# Patient Record
Sex: MALE | Race: BLACK OR AFRICAN AMERICAN
[De-identification: names, ages, dates, MRNs, and addresses within clinical notes are randomized per-mention and may not be internally consistent; named-entity substitution may affect disease eponyms.]

---

## 2019-02-16 ENCOUNTER — HOSPITAL ENCOUNTER (EMERGENCY)
Dept: HOSPITAL 97 - ER | Age: 29
Discharge: HOME | End: 2019-02-16
Payer: COMMERCIAL
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
## 2019-02-16 DIAGNOSIS — Z88.0: ICD-10-CM

## 2019-02-16 DIAGNOSIS — Z72.0: ICD-10-CM

## 2019-02-16 DIAGNOSIS — S62.360A: Primary | ICD-10-CM

## 2019-02-16 PROCEDURE — 99284 EMERGENCY DEPT VISIT MOD MDM: CPT

## 2019-02-16 PROCEDURE — 2W3CX1Z IMMOBILIZATION OF RIGHT LOWER ARM USING SPLINT: ICD-10-PCS

## 2019-02-16 NOTE — ER
Nurse's Notes                                                                                     

 [facility]                                                                

Name: Jed Del Valle Jr                                                                            

Age: 29 yrs                                                                                       

Sex: Male                                                                                         

: 1990                                                                                   

MRN: [id number]                                                                                   

Arrival Date: 2019                                                                          

Time: 18:01                                                                                       

Account#: [id number]                                                                            

Bed 28                                                                                            

Private MD:                                                                                       

Diagnosis: Nondisplaced fracture of neck of second metacarpal bone, right hand                    

                                                                                                  

Presentation:                                                                                     

                                                                                             

18:03 Presenting complaint: Patient states: i dropped a speaker on my R hand, happened early  hj  

      today; reports numbness, tingling and pain; pain is 10/10;. Transition of care: patient     

      was not received from another setting of care. Onset of symptoms was 2019.     

      Risk Assessment: Do you want to hurt yourself or someone else? Patient reports no           

      desire to harm self or others. Initial Sepsis Screen: Does the patient meet any 2           

      criteria? No. Patient's initial sepsis screen is negative. Does the patient have a          

      suspected source of infection? No. Patient's initial sepsis screen is negative. Care        

      prior to arrival: None.                                                                     

18:03 Method Of Arrival: Ambulatory                                                             

18:03 Acuity: TIAGO 4                                                                           hj  

                                                                                                  

Triage Assessment:                                                                                

18:06 General: Appears in no apparent distress. uncomfortable, Behavior is calm, cooperative, hj  

      appropriate for age. Pain: Complains of pain in right hand.                                 

                                                                                                  

Historical:                                                                                       

- Allergies:                                                                                      

18:13 PENICILLINS;                                                                            mg2 

- Home Meds:                                                                                      

18:06 None [Active];                                                                          hj  

- PMHx:                                                                                           

18:06 None;                                                                                   hj  

- PSHx:                                                                                           

18:06 hand surgery;                                                                           hj  

                                                                                                  

- Immunization history:: Adult Immunizations up to date.                                          

- Social history:: Smoking status: Patient uses tobacco products, Patient uses alcohol.           

- Ebola Screening: : Patient negative for fever greater than or equal to 101.5 degrees            

  Fahrenheit, and additional compatible Ebola Virus Disease symptoms Patient denies               

  exposure to infectious person Patient denies travel to an Ebola-affected area in the            

  21 days before illness onset.                                                                   

                                                                                                  

                                                                                                  

Screenin:06 Abuse screen: Denies threats or abuse. Denies injuries from another. Nutritional        hj  

      screening: No deficits noted. Tuberculosis screening: No symptoms or risk factors           

      identified. Fall Risk None identified.                                                      

                                                                                                  

Assessment:                                                                                       

18:18 General: Appears in no apparent distress. comfortable, Behavior is calm, cooperative.   mg2 

      Pain: Complains of pain in right hand Pain does not radiate. Pain currently is 5 out of     

      10 on a pain scale. Quality of pain is described as aching, Pain began suddenly, this       

      morning Is intermittent. Neuro: Level of Consciousness is awake, alert, obeys commands,     

      Oriented to person, place, time, situation. Cardiovascular: Capillary refill < 3            

      seconds Patient's skin is warm and dry. Respiratory: Airway is patent Respiratory           

      effort is even, unlabored, Respiratory pattern is regular, symmetrical. GI: No signs        

      and/or symptoms were reported involving the gastrointestinal system. : No signs           

      and/or symptoms were reported regarding the genitourinary system. EENT: No signs and/or     

      symptoms were reported regarding the EENT system. Derm: Skin is intact, is healthy with     

      good turgor, Skin is pink, warm \T\ dry. normal. Musculoskeletal: Circulation, motion,      

      and sensation intact. Capillary refill < 3 seconds, Swelling present in right hand.         

      Injury Description: swelling.                                                               

20:35 Reassessment: Patient appears in no apparent distress at this time.                     mg2 

                                                                                                  

Vital Signs:                                                                                      

18:07  / 87; Pulse 87; Resp 18; Temp 99.1(O); Pulse Ox 100% on R/A; Weight 79.38 kg;    hj  

      Height 5 ft. 9 in. (175.26 cm); Pain 10/10;                                                 

20:35  / 78; Pulse 80; Resp 18; Pulse Ox 100% on R/A; Pain 0/10;                        mg2 

18:07 Body Mass Index 25.84 (79.38 kg, 175.26 cm)                                               

                                                                                                  

ED Course:                                                                                        

18:01 Patient arrived in ED.                                                                  ds1 

18:06 Triage completed.                                                                       hj  

18:07 Arm band placed on left wrist.                                                          hj  

18:07 Patient has correct armband on for positive identification. Bed in low position. Call     

      light in reach. Side rails up X 1. Adult w/ patient.                                        

18:08 Jerzy Cho PA is PHCP.                                                              [facility] 

18:08 Parth Singh MD is Attending Physician.                                              [facility] 

18:11 Jewel Middleton, CB is Primary Nurse.                                                  mg2 

18:21 No provider procedures requiring assistance completed. Patient did not have IV access   mg2 

      during this emergency room visit.                                                           

19:31 Hand Right 3 View XRAY In Process Unspecified.                                          EDMS

20:23 Ace wrap to right wrist Orthoglass splint: Volar splint applied on right arm.           jp3 

20:27 Trey Contreras MD is Referral Physician.                                              [facility] 

                                                                                                  

Administered Medications:                                                                         

20:27 Drug: Motrin 400 mg Route: PO;                                                          mg2 

20:35 Follow up: Response: No adverse reaction; Medication administered at discharge.         mg2 

                                                                                                  

                                                                                                  

Outcome:                                                                                          

20:28 Discharge ordered by MD.                                                                matti 

20:36 Discharged to home ambulatory.                                                          mg2 

20:36 Condition: stable                                                                           

20:36 Discharge instructions given to patient, friend, Instructed on discharge instructions,      

      follow up and referral plans. medication usage, Demonstrated understanding of               

      instructions, follow-up care, medications, splint care, Prescriptions given X 1.            

20:36 Patient left the ED.                                                                    mg2 

                                                                                                  

Signatures:                                                                                       

Dispatcher [employer]                           EDMS                                                 

Jerzy Cho PA PA   [facility]                                                  

Tiara Dubose                                ds1                                                  

Holger Pool, RN                      RN   Jewel Chow RN                    RN   mg2                                                  

Timmy Johnson                              jp3                                                  

                                                                                                  

Corrections: (The following items were deleted from the chart)                                    

18:09 18:07 Pulse 87bpm; Resp 18bpm; Pulse Ox 100% RA; Temp 99.1F Oral; 79.38 kg; Height 5    hj  

      ft. 9 in.; BMI: 25.8; Pain 10/10; hj                                                        

18:13 18:06 Allergies: No Known Allergies;                                                  mg2 

                                                                                                  

**************************************************************************************************

## 2019-02-16 NOTE — EDPHYS
Physician Documentation                                                                           

 [facility]                                                                

Name: Jed Del Valle Jr                                                                            

Age: 29 yrs                                                                                       

Sex: Male                                                                                         

: 1990                                                                                   

MRN: [id number]                                                                                   

Arrival Date: 2019                                                                          

Time: 18:01                                                                                       

Account#: [id number]                                                                            

Bed 28                                                                                            

Private MD:                                                                                       

ED Physician Parth Singh                                                                       

HPI:                                                                                              

                                                                                             

18:28 This 29 yrs old Black Male presents to ER via Ambulatory with complaints of Hand Injury.jmm 

18:28 The patient or guardian reports injury, pain. Onset: The symptoms/episode               jmm 

      began/occurred acutely, today. Modifying factors: The symptoms are alleviated by            

      nothing, the symptoms are aggravated by nothing. Associated signs and symptoms:. This       

      is a 29 year old male with no chronic medical conditions that presents to the ED with       

      complaints of right hand pain after a speaker box fell on his hand. Denies other            

      injury..                                                                                    

                                                                                                  

Historical:                                                                                       

- Allergies:                                                                                      

18:13 PENICILLINS;                                                                            mg2 

- Home Meds:                                                                                      

18:06 None [Active];                                                                          hj  

- PMHx:                                                                                           

18:06 None;                                                                                   hj  

- PSHx:                                                                                           

18:06 hand surgery;                                                                           hj  

                                                                                                  

- Immunization history:: Adult Immunizations up to date.                                          

- Social history:: Smoking status: Patient uses tobacco products, Patient uses alcohol.           

- Ebola Screening: : Patient negative for fever greater than or equal to 101.5 degrees            

  Fahrenheit, and additional compatible Ebola Virus Disease symptoms Patient denies               

  exposure to infectious person Patient denies travel to an Ebola-affected area in the            

  21 days before illness onset.                                                                   

                                                                                                  

                                                                                                  

ROS:                                                                                              

18:28 Constitutional: Negative for fever, chills, and weight loss, Cardiovascular: Negative   jmm 

      for chest pain, palpitations, and edema, Respiratory: Negative for shortness of breath,     

      cough, wheezing, and pleuritic chest pain.                                                  

18:28 MS/extremity: Positive for pain, rash.                                                      

18:28 All other systems are negative.                                                             

                                                                                                  

Exam:                                                                                             

18:28 Constitutional:  This is a well developed, well nourished patient who is awake, alert,  jmm 

      and in no acute distress. Head/Face:  atraumatic. Eyes:  EOMI, no conjunctival erythema     

      appreciated ENT:  Moist Mucus Membranes Neck:  Trachea midline, Supple Chest/axilla:        

      Normal chest wall appearance and motion.   Cardiovascular:  Regular rate and rhythm.        

      No edema appreciated Respiratory:  Normal respirations, no respiratory distress             

      appreciated Abdomen/GI:  Non distended, soft Back:  Normal ROM Skin:  General               

      appearance color normal                                                                     

18:28 Musculoskeletal/extremity: ROM: intact in all extremities, swelling noted to the right      

      hand,  mild tenderness on palpation of the right 2nd and 3rd metacarpals, compartments      

      are soft, NVI, < 2 sec dist cap refill.                                                     

18:28 Skin: Appearance: Color: normal in color.                                                   

18:28 Neuro: Orientation: is normal, Mentation: is normal, Memory: is normal.                     

18:28 Psych: Behavior/mood is pleasant, cooperative.                                              

                                                                                                  

Vital Signs:                                                                                      

18:07  / 87; Pulse 87; Resp 18; Temp 99.1(O); Pulse Ox 100% on R/A; Weight 79.38 kg;    hj  

      Height 5 ft. 9 in. (175.26 cm); Pain 10/10;                                                 

20:35  / 78; Pulse 80; Resp 18; Pulse Ox 100% on R/A; Pain 0/10;                        mg2 

18:07 Body Mass Index 25.84 (79.38 kg, 175.26 cm)                                               

                                                                                                  

Procedures:                                                                                       

20:24 Splinting: Splint applied to right hand using volar. applied by tech. Examined by me,   andrey 

      post splint application: neurovascular intact, 2+ distal pulses palpable, brisk             

      capillary refill noted, Patient tolerated well.                                             

                                                                                                  

MDM:                                                                                              

18:28 Patient medically screened.                                                             andrey 

20:24 Data reviewed: vital signs, nurses notes. Counseling: I had a detailed discussion with  andrey 

      the patient and/or guardian regarding: the historical points, exam findings, and any        

      diagnostic results supporting the discharge/admit diagnosis, radiology results, the         

      need for outpatient follow up, to return to the emergency department if symptoms worsen     

      or persist or if there are any questions or concerns that arise at home.                    

20:24 ED course: Patient is advised to follow up with hand for further evaluation. patient    andrey 

      given compartment syndrome return precautions. .                                            

                                                                                                  

                                                                                             

18:31 Order name: Hand Right 3 View XRAY; Complete Time: 19:50                                andrey 

                                                                                             

19:53 Order name: Volar Wrist Splint; Complete Time: 20:23                                    andrey 

                                                                                                  

Administered Medications:                                                                         

20:27 Drug: Motrin 400 mg Route: PO;                                                          mg2 

20:35 Follow up: Response: No adverse reaction; Medication administered at discharge.         mg2 

                                                                                                  

                                                                                                  

Disposition:                                                                                      

                                                                                             

17:51 Co-signature as Attending Physician, Parth Singh MD.                                    

                                                                                                  

Disposition:                                                                                      

19 20:28 Discharged to Home. Impression: Nondisplaced fracture of neck of second            

  metacarpal bone, right hand.                                                                    

- Condition is Stable.                                                                            

- Discharge Instructions: Metacarpal Fracture.                                                    

- Prescriptions for Ultracet 37.5- 325 mg Oral Tablet - take 1 tablet by ORAL route               

  every 6 hours - for up to 5 days; do not exceed 8 tablets per day.; 20 tablet.                  

- Medication Reconciliation Form, Thank You Letter, Antibiotic Education, Prescription            

  Opioid Use, Work release form form.                                                             

- Follow up: Trey Contreras MD; When: 2 - 3 days; Reason: Recheck today's complaints,           

  Continuance of care, Re-evaluation by your physician.                                           

                                                                                                  

                                                                                                  

                                                                                                  

Signatures:                                                                                       

Dispatcher [employer]                           EDMS                                                 

Jerzy Cho PA PA jmm Joaquin, Henry, RN RN                                                      

Parth Singh MD MD gs Gardose, Michele, RN                    RN   mg2                                                  

                                                                                                  

Corrections: (The following items were deleted from the chart)                                    

                                                                                             

18:13 18:06 Allergies: No Known Allergies;                                                  mg2 

20:36 20:28 2019 20:28 Discharged to Home. Impression: Nondisplaced fracture of neck of mg2 

      second metacarpal bone, right hand. Condition is Stable. Forms are Work release form,       

      Medication Reconciliation Form, Thank You Letter, Antibiotic Education, Prescription        

      Opioid Use. Follow up: Trey Contreras; When: 2 - 3 days; Reason: Recheck today's            

      complaints, Continuance of care, Re-evaluation by your physician. andrey                       

                                                                                                  

**************************************************************************************************

## 2019-02-16 NOTE — RAD REPORT
EXAM DESCRIPTION:  RAD - Hand Right 3 View - 2/16/2019 7:30 pm

 

CLINICAL HISTORY:  injury

Trauma, pain

 

COMPARISON:  No comparisons

 

FINDINGS:  Cortical irregularity is seen in the region of the second metacarpal neck, suspicious for 
fracture. Mild soft tissue swelling is present the dorsum of the hand.

## 2019-03-13 ENCOUNTER — HOSPITAL ENCOUNTER (EMERGENCY)
Dept: HOSPITAL 97 - ER | Age: 29
Discharge: HOME | End: 2019-03-13
Payer: COMMERCIAL

## 2019-03-13 DIAGNOSIS — Z88.0: ICD-10-CM

## 2019-03-13 DIAGNOSIS — A64: Primary | ICD-10-CM

## 2019-03-13 LAB — UA COMPLETE W REFLEX CULTURE PNL UR: (no result)

## 2019-03-13 PROCEDURE — 81015 MICROSCOPIC EXAM OF URINE: CPT

## 2019-03-13 PROCEDURE — 87088 URINE BACTERIA CULTURE: CPT

## 2019-03-13 PROCEDURE — 99283 EMERGENCY DEPT VISIT LOW MDM: CPT

## 2019-03-13 PROCEDURE — 96372 THER/PROPH/DIAG INJ SC/IM: CPT

## 2019-03-13 PROCEDURE — 81003 URINALYSIS AUTO W/O SCOPE: CPT

## 2019-03-13 PROCEDURE — 87086 URINE CULTURE/COLONY COUNT: CPT

## 2019-03-13 NOTE — EDPHYS
Physician Documentation                                                                           

 [facility]                                                                

Name: Jed Del Valle Jr                                                                            

Age: 29 yrs                                                                                       

Sex: Male                                                                                         

: 1990                                                                                   

MRN: [id number]                                                                                   

Arrival Date: 2019                                                                          

Time: 18:48                                                                                       

Account#: [id number]                                                                            

Bed 10                                                                                            

Private MD:                                                                                       

ED Physician Parth Singh                                                                       

HPI:                                                                                              

                                                                                             

20:05 This 29 yrs old Black Male presents to ER via Ambulatory with complaints of Pain With   kb  

      Urination.                                                                                  

20:05 The patient presents with a possible STD exposure, symptoms include dysuria, white      kb  

      discharge, urinary symptoms, dysuria. Onset: The symptoms/episode began/occurred this       

      morning. Modifying factors: The symptoms are alleviated by nothing, the symptoms are        

      aggravated by urinating. Associated signs and symptoms: Pertinent positives: dysuria,       

      Pertinent negatives: abdominal pain, constipation, diarrhea, fever, hematuria, nausea,      

      vomiting. Severity of symptoms: At their worst the symptoms were moderate, in the           

      emergency department the symptoms are unchanged. The patient has experienced a previous     

      episode. The patient has not recently seen a physician. Pt reports dysuria and white        

      penile discharge that started this morning. Attests to having unprotected sex..             

                                                                                                  

Historical:                                                                                       

- Allergies:                                                                                      

19:17 PENICILLINS;                                                                            sg  

- Home Meds:                                                                                      

19:17 None [Active];                                                                          sg  

- PMHx:                                                                                           

19:17 None;                                                                                   sg  

- PSHx:                                                                                           

19:17 None;                                                                                   sg  

                                                                                                  

- Immunization history:: Flu vaccine is up to date.                                               

- Social history:: Smoking status: Patient uses tobacco products, denies chronic                  

  smoking, but will smoke occasionally.                                                           

- Ebola Screening: : Patient denies travel to an Ebola-affected area in the 21 days               

  before illness onset.                                                                           

                                                                                                  

                                                                                                  

ROS:                                                                                              

20:05 Constitutional: Negative for fever, chills, and weight loss, Cardiovascular: Negative   kb  

      for chest pain, palpitations, and edema, Respiratory: Negative for shortness of breath,     

      cough, wheezing, and pleuritic chest pain, Abdomen/GI: Negative for abdominal pain,         

      nausea, vomiting, diarrhea, and constipation, Back: Negative for injury and pain,           

      MS/Extremity: Negative for injury and deformity, Skin: Negative for injury, rash, and       

      discoloration, Neuro: Negative for headache, weakness, numbness, tingling, and seizure.     

20:05 : Positive for urinary symptoms, burning with urination.                                  

                                                                                                  

Exam:                                                                                             

20:05 Constitutional:  This is a well developed, well nourished patient who is awake, alert,  kb  

      and in no acute distress. Head/Face:  Normocephalic, atraumatic. Neck:  Trachea             

      midline, no thyromegaly or masses palpated, and no cervical lymphadenopathy.  Supple,       

      full range of motion without nuchal rigidity, or vertebral point tenderness.  No            

      Meningismus. Chest/axilla:  Normal chest wall appearance and motion.  Nontender with no     

      deformity.  No lesions are appreciated. Cardiovascular:  Regular rate and rhythm with a     

      normal S1 and S2.  No gallops, murmurs, or rubs.  Normal PMI, no JVD.  No pulse             

      deficits. Respiratory:  Lungs have equal breath sounds bilaterally, clear to                

      auscultation and percussion.  No rales, rhonchi or wheezes noted.  No increased work of     

      breathing, no retractions or nasal flaring. Abdomen/GI:  Soft, non-tender, with normal      

      bowel sounds.  No distension or tympany.  No guarding or rebound.  No evidence of           

      tenderness throughout. Skin:  Warm, dry with normal turgor.  Normal color with no           

      rashes, no lesions, and no evidence of cellulitis. MS/ Extremity:  Pulses equal, no         

      cyanosis.  Neurovascular intact.  Full, normal range of motion. Neuro:  Awake and           

      alert, GCS 15, oriented to person, place, time, and situation.  Cranial nerves II-XII       

      grossly intact.  Motor strength 5/5 in all extremities.  Sensory grossly intact.            

      Cerebellar exam normal.  Normal gait.                                                       

                                                                                                  

Vital Signs:                                                                                      

19:17  / 75; Pulse 91; Resp 18; Temp 97.5; Pulse Ox 98% on R/A; Weight 79.38 kg; Height sg  

      6 ft. 0 in. (182.88 cm) (R); Pain 0/10;                                                     

21:00  / 58; Pulse 81; Resp 16; Pulse Ox 97% on R/A;                                    jb4 

19:17 Body Mass Index 23.73 (79.38 kg, 182.88 cm)                                             sg  

                                                                                                  

MDM:                                                                                              

19:46 Patient medically screened.                                                             kb  

20:04 Data reviewed: vital signs, nurses notes. Data interpreted: Pulse oximetry: on room air kb  

      is 98 %. Interpretation: normal. Counseling: I had a detailed discussion with the           

      patient and/or guardian regarding: the historical points, exam findings, and any            

      diagnostic results supporting the discharge/admit diagnosis, lab results, the need for      

      outpatient follow up, a family practitioner, to return to the emergency department if       

      symptoms worsen or persist or if there are any questions or concerns that arise at home.    

                                                                                                  

                                                                                             

19:25 Order name: Urine Microscopic Only                                                      kb  

                                                                                             

21:05 Order name: Urine Dipstick--Ancillary (enter results)                                   mw2 

                                                                                             

19:25 Order name: Urine Dipstick-Ancillary (obtain specimen); Complete Time: 21:02            kb  

                                                                                                  

Administered Medications:                                                                         

20:06 Drug: Zithromax 1 grams Route: PO;                                                      jb4 

21:00 Follow up: Response: No adverse reaction                                                jb4 

20:10 Drug: Rocephin (cefTRIAXone) 250 mg Route: IM; Site: right gluteus;                     jb4 

21:00 Follow up: Response: No adverse reaction                                                jb4 

                                                                                                  

                                                                                                  

Disposition:                                                                                      

23:19 Co-signature as Attending Physician, Parth Singh MD.                                    

                                                                                                  

Disposition:                                                                                      

19 21:08 Discharged to Home. Impression: Dysuria, Unspecified sexually transmitted          

  disease.                                                                                        

- Condition is Stable.                                                                            

- Discharge Instructions: Dysuria, Sexually Transmitted Disease, Easy-to-Read.                    

- Prescriptions for Doxycycline Hyclate 100 mg Oral Tablet - take 1 tablet by ORAL                

  route every 12 hours for 7 days; 14 tablet.                                                     

- Medication Reconciliation Form, Thank You Letter, Antibiotic Education, Prescription            

  Opioid Use form.                                                                                

- Follow up: Emergency Department; When: As needed; Reason: Worsening of condition.               

  Follow up: Private Physician; When: 2 - 3 days; Reason: Recheck today's complaints,             

  Continuance of care, Re-evaluation by your physician.                                           

                                                                                                  

                                                                                                  

                                                                                                  

Signatures:                                                                                       

Dispatcher [employer]                           [facility]                                                 

Gissel Velazco, VIRI-C                 FNP-Dallin Jensen RN RN sg Bryson, James, RN RN jb4 Starr, Gregory, MD MD gs                                                   

                                                                                                  

Corrections: (The following items were deleted from the chart)                                    

21:19 21:08 2019 21:08 Discharged to Home. Impression: Dysuria; Unspecified sexually    jb4 

      transmitted disease. Condition is Stable. Discharge Instructions: Sexually Transmitted      

      Disease, Easy-to-Read. Prescriptions for Doxycycline Hyclate 100 mg Oral Tablet - take      

      1 tablet by ORAL route every 12 hours for 7 days; 14 tablet. and Forms are Medication       

      Reconciliation Form, Thank You Letter, Antibiotic Education, Prescription Opioid Use.       

      Follow up: Emergency Department; When: As needed; Reason: Worsening of condition.           

      Follow up: Private Physician; When: 2 - 3 days; Reason: Recheck today's complaints,         

      Continuance of care, Re-evaluation by your physician. kb                                    

                                                                                                  

**************************************************************************************************

## 2019-03-13 NOTE — ER
Nurse's Notes                                                                                     

 [facility]                                                                

Name: Jed Del Valle Jr                                                                            

Age: 29 yrs                                                                                       

Sex: Male                                                                                         

: 1990                                                                                   

MRN: [id number]                                                                                   

Arrival Date: 2019                                                                          

Time: 18:48                                                                                       

Account#: [id number]                                                                            

Bed 10                                                                                            

Private MD:                                                                                       

Diagnosis: Dysuria;Unspecified sexually transmitted disease                                       

                                                                                                  

Presentation:                                                                                     

                                                                                             

19:16 Presenting complaint: Patient states: "I went to use the bathroom and it burned and     sg  

      it's still burning when I pee, but I don't have no discharge coming out" Denies fever.      

      Transition of care: patient was not received from another setting of care. Onset of         

      symptoms was 2019. Risk Assessment: Do you want to hurt yourself or someone       

      else? Patient reports no desire to harm self or others. Initial Sepsis Screen: Does the     

      patient meet any 2 criteria? HR > 90 bpm. No. Patient's initial sepsis screen is            

      negative. Does the patient have a suspected source of infection? Yes:                       

      Dysuria/Frequency/Urgency/UTI. Care prior to arrival: None.                                 

19:16 Method Of Arrival: Ambulatory                                                           sg  

19:16 Acuity: TIAGO 4                                                                           sg  

                                                                                                  

Triage Assessment:                                                                                

19:17 General: Appears in no apparent distress. comfortable, Behavior is calm, cooperative,   sg  

      appropriate for age. Pain: Complains of pain in pelvis Pain currently is 0 out of 10 on     

      a pain scale. Neuro: Level of Consciousness is awake, alert, obeys commands.                

      Cardiovascular: Patient's skin is warm and dry. Respiratory: Airway is patent               

      Respiratory effort is even, unlabored, Respiratory pattern is regular, symmetrical. :     

      Reports burning with urination.                                                             

                                                                                                  

Historical:                                                                                       

- Allergies:                                                                                      

19:17 PENICILLINS;                                                                            sg  

- Home Meds:                                                                                      

19:17 None [Active];                                                                          sg  

- PMHx:                                                                                           

19:17 None;                                                                                   sg  

- PSHx:                                                                                           

19:17 None;                                                                                   sg  

                                                                                                  

- Immunization history:: Flu vaccine is up to date.                                               

- Social history:: Smoking status: Patient uses tobacco products, denies chronic                  

  smoking, but will smoke occasionally.                                                           

- Ebola Screening: : Patient denies travel to an Ebola-affected area in the 21 days               

  before illness onset.                                                                           

                                                                                                  

                                                                                                  

Screenin:00 Abuse screen: Denies threats or abuse. Nutritional screening: No deficits noted.        jb4 

      Tuberculosis screening: No symptoms or risk factors identified. Fall Risk None              

      identified.                                                                                 

                                                                                                  

Assessment:                                                                                       

20:00 General: Appears in no apparent distress. comfortable, Behavior is calm, cooperative,   jb4 

      appropriate for age. Pain: Denies pain. Neuro: Level of Consciousness is awake, alert,      

      obeys commands, Oriented to person, place, time, situation. Cardiovascular: Patient's       

      skin is warm and dry. Respiratory: Airway is patent Respiratory effort is even,             

      unlabored, Respiratory pattern is regular, symmetrical. GI: No signs and/or symptoms        

      were reported involving the gastrointestinal system. : Reports burning with               

      urination, discharge, from penis that is white, pain with urination. EENT: No signs         

      and/or symptoms were reported regarding the EENT system. Derm: Skin is intact, Skin is      

      dry, Skin is normal, Skin temperature is warm. Musculoskeletal: Circulation, motion,        

      and sensation intact.                                                                       

21:00 Reassessment: Patient appears in no apparent distress at this time. Patient and/or      jb4 

      family updated on plan of care and expected duration. Pain level reassessed. Patient is     

      alert, oriented x 3, equal unlabored respirations, skin warm/dry/pink.                      

                                                                                                  

Vital Signs:                                                                                      

19:17  / 75; Pulse 91; Resp 18; Temp 97.5; Pulse Ox 98% on R/A; Weight 79.38 kg; Height sg  

      6 ft. 0 in. (182.88 cm) (R); Pain 0/10;                                                     

21:00  / 58; Pulse 81; Resp 16; Pulse Ox 97% on R/A;                                    jb4 

19:17 Body Mass Index 23.73 (79.38 kg, 182.88 cm)                                               

                                                                                                  

ED Course:                                                                                        

18:48 Patient arrived in ED.                                                                  as  

19:17 Triage completed.                                                                       sg  

19:17 Arm band placed on Patient placed in waiting room, Patient notified of wait time.       sg  

19:44 [facility]co [facility]                                                        kb  

19:44 Parth Singh MD is Attending Physician.                                              kb  

19:51 Matthew Syed, RN is Primary Nurse.                                                     jb4 

20:00 Patient has correct armband on for positive identification. Bed in low position. Call   jb4 

      light in reach. Side rails up X 1. Pulse ox on. NIBP on.                                    

21:18 No provider procedures requiring assistance completed. Patient did not have IV access   jb4 

      during this emergency room visit.                                                           

                                                                                                  

Administered Medications:                                                                         

20:06 Drug: Zithromax 1 grams Route: PO;                                                      jb4 

21:00 Follow up: Response: No adverse reaction                                                jb4 

20:10 Drug: Rocephin (cefTRIAXone) 250 mg Route: IM; Site: right gluteus;                     jb4 

21:00 Follow up: Response: No adverse reaction                                                jb4 

                                                                                                  

                                                                                                  

Outcome:                                                                                          

21:08 Discharge ordered by MD.                                                                abelardo  

21:18 Discharged to home ambulatory.                                                          jb4 

21:18 Condition: stable                                                                           

21:18 Discharge instructions given to patient, Instructed on discharge instructions, follow       

      up and referral plans. medication usage, safe sex practices, Demonstrated understanding     

      of instructions, follow-up care, medications, Prescriptions given X 1.                      

21:19 Patient left the ED.                                                                    jb4 

                                                                                                  

Signatures:                                                                                       

Gissel Velazco, DANILOP-C                 VIRI-Dallin Jensen, RN                         RN   Barbara Irizarry James, RN                       RN   jb4                                                  

                                                                                                  

**************************************************************************************************

## 2019-04-14 ENCOUNTER — HOSPITAL ENCOUNTER (EMERGENCY)
Dept: HOSPITAL 97 - ER | Age: 29
Discharge: ANOTHER HOSPITAL | End: 2019-04-14
Payer: COMMERCIAL

## 2019-04-14 DIAGNOSIS — S02.66XA: ICD-10-CM

## 2019-04-14 DIAGNOSIS — S02.652A: Primary | ICD-10-CM
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
## 2019-04-14 DIAGNOSIS — Z88.0: ICD-10-CM
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
## 2019-04-14 LAB
BUN BLD-MCNC: 12 MG/DL (ref 7–18)
GLUCOSE SERPLBLD-MCNC: 93 MG/DL (ref 74–106)
HCT VFR BLD CALC: 43.2 % (ref 39.6–49)
LYMPHOCYTES # SPEC AUTO: 2.3 K/UL (ref 0.7–4.9)
MORPHOLOGY BLD-IMP: (no result)
PMV BLD: 8.6 FL (ref 7.6–11.3)
POTASSIUM SERPL-SCNC: 3.9 MMOL/L (ref 3.5–5.1)
RBC # BLD: 5.11 M/UL (ref 4.33–5.43)

## 2019-04-14 PROCEDURE — 96365 THER/PROPH/DIAG IV INF INIT: CPT

## 2019-04-14 PROCEDURE — 96375 TX/PRO/DX INJ NEW DRUG ADDON: CPT

## 2019-04-14 PROCEDURE — 76377 3D RENDER W/INTRP POSTPROCES: CPT

## 2019-04-14 PROCEDURE — 99285 EMERGENCY DEPT VISIT HI MDM: CPT

## 2019-04-14 PROCEDURE — 36415 COLL VENOUS BLD VENIPUNCTURE: CPT

## 2019-04-14 PROCEDURE — 70498 CT ANGIOGRAPHY NECK: CPT

## 2019-04-14 PROCEDURE — 85025 COMPLETE CBC W/AUTO DIFF WBC: CPT

## 2019-04-14 PROCEDURE — 70486 CT MAXILLOFACIAL W/O DYE: CPT

## 2019-04-14 PROCEDURE — 70450 CT HEAD/BRAIN W/O DYE: CPT

## 2019-04-14 PROCEDURE — 80048 BASIC METABOLIC PNL TOTAL CA: CPT
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
## 2019-04-15 NOTE — RAD REPORT
EXAM DESCRIPTION:  CT - Neck Angio - 4/14/2019 5:56 am

 

CLINICAL HISTORY:     The patient is 29 years old and is Male; facial/neck trauma, eval for vascular 
injury

 

TECHNIQUE:  Axial computed tomographic angiography images of the neck with intravenous contrast using
 CT angiography protocol.   Sagittal and coronal reformatted images were created and reviewed.   Sagi
ttal and coronal reformatted images were created and reviewed.   This CT exam was performed using one
 or more of the following dose reduction techniques:   automated exposure control, adjustment of the 
mA and/or kV according to patient size, and/or use of iterative reconstruction technique.

   MIP reconstructed images were created and reviewed.

 

COMPARISON:  No relevant prior studies available.

 

FINDINGS:  VASCULATURE:

   RIGHT COMMON CAROTID ARTERY:   Unremarkable.   No significant stenosis.   No dissection or occlusi
on.

   RIGHT INTERNAL CAROTID ARTERY:   Unremarkable.   Extracranial segment is patent with no significan
t stenosis.   No dissection or occlusion.

   RIGHT EXTERNAL CAROTID ARTERY:   Unremarkable.   No occlusion.

   RIGHT VERTEBRAL ARTERY:   Unremarkable.   No significant stenosis.   No dissection or occlusion.

   LEFT COMMON CAROTID ARTERY:   Unremarkable.   No significant stenosis.   No dissection or occlusio
n.

   LEFT INTERNAL CAROTID ARTERY:   Unremarkable.   Extracranial segment is patent with no significant
 stenosis.   No dissection or occlusion.

   LEFT EXTERNAL CAROTID ARTERY:   Unremarkable.   No occlusion.

   LEFT VERTEBRAL ARTERY:   Unremarkable.   No significant stenosis.   No dissection or occlusion.

NECK:

   BONES/JOINTS:   Acute fractures of the mandible, right parasymphyseal location and left ankle mild
 diastases at the fracture site are again noted.   Vertebral body heights and alignment are maintaine
d. There is no evidence of fracture or malalignment of the cervical spine.

   SOFT TISSUES:   Unremarkable as visualized.   No mass.

   LUNG APICES:   The lung apices are clear.

CAROTID STENOSIS REFERENCE USING NASCET CRITERIA:

   % ICA stenosis = (1 - narrowest ICA diameter/diameter of distal cervical ICA) x 100.

   Mild -    Moderate - 50-69% stenosis.

   Severe - 70-94% stenosis.

   Near occlusion - 95-99% stenosis.

   Occluded - 100% stenosis.

 

IMPRESSION:  No evidence of vascular injury.

 

Electronically signed by:   Julia Narayan MD   4/14/2019 5:49 AM CDT Workstation: [id number]

 

 

Due to temporary technical issues with the PACS/Fluency reporting system, reports are being signed by
 the in house radiologist as a courtesy to ensure prompt reporting. The interpreting radiologist is f
ully responsible for the content of the report.